# Patient Record
Sex: MALE | Race: ASIAN | URBAN - METROPOLITAN AREA
[De-identification: names, ages, dates, MRNs, and addresses within clinical notes are randomized per-mention and may not be internally consistent; named-entity substitution may affect disease eponyms.]

---

## 2018-09-01 ENCOUNTER — OFFICE VISIT (OUTPATIENT)
Dept: URGENT CARE | Facility: URGENT CARE | Age: 43
End: 2018-09-01
Payer: COMMERCIAL

## 2018-09-01 VITALS
DIASTOLIC BLOOD PRESSURE: 88 MMHG | SYSTOLIC BLOOD PRESSURE: 126 MMHG | HEART RATE: 104 BPM | RESPIRATION RATE: 14 BRPM | WEIGHT: 144.6 LBS | OXYGEN SATURATION: 98 % | TEMPERATURE: 100.8 F

## 2018-09-01 DIAGNOSIS — R50.9 FEVER WITH CHILLS: ICD-10-CM

## 2018-09-01 DIAGNOSIS — R06.02 SOB (SHORTNESS OF BREATH): ICD-10-CM

## 2018-09-01 DIAGNOSIS — R19.7 DIARRHEA, UNSPECIFIED TYPE: ICD-10-CM

## 2018-09-01 DIAGNOSIS — R65.10 SIRS (SYSTEMIC INFLAMMATORY RESPONSE SYNDROME) (H): Primary | ICD-10-CM

## 2018-09-01 DIAGNOSIS — R10.10 PAIN OF UPPER ABDOMEN: ICD-10-CM

## 2018-09-01 DIAGNOSIS — R40.0 SOMNOLENCE: ICD-10-CM

## 2018-09-01 PROCEDURE — 99205 OFFICE O/P NEW HI 60 MIN: CPT | Performed by: FAMILY MEDICINE

## 2018-09-01 NOTE — PROGRESS NOTES
SUBJECTIVE:   Enoc Zhang is a 43 year old male who presents to clinic today for the following health issues:      Chief Complaint   Patient presents with     Breathing Problem     SOB, diarrhea 10 times today, fever 100.2 oral, upper abdominal pain, chills x today.       Duration: today     Description (location/character/radiation): c/o breathing difficulty, diarrhea x 10 times, upper abdominal pain, fever, chills     Intensity:  severe    Accompanying signs and symptoms: Generalized weakness/fatigue    History (similar episodes/previous evaluation): None    Precipitating or alleviating factors: did eat seafood last night    Therapies tried and outcome: OTC analgesia     Denies using reactional drugs or excessive alcohol intake        Problem list and histories reviewed & adjusted, as indicated.  Additional history: as documented        Social History     Social History     Marital status: N/A     Spouse name: N/A     Number of children: N/A     Years of education: N/A     Occupational History     Not on file.     Social History Main Topics     Smoking status: Unknown If Ever Smoked     Smokeless tobacco: Never Used     Alcohol use Not on file     Drug use: Not on file     Sexual activity: Not on file     Other Topics Concern     Not on file     Social History Narrative     No narrative on file     Current Outpatient Prescriptions   Medication Sig Dispense Refill     study - albuterol (IDS# 5119) 108 (90 BASE) MCG/ACT Inhaler Inhale 2 puffs into the lungs every 6 hours       No Known Allergies  No lab results found.   BP Readings from Last 3 Encounters:   09/01/18 126/88    Wt Readings from Last 3 Encounters:   09/01/18 144 lb 9.6 oz (65.6 kg)                  Labs reviewed in EPIC    Reviewed and updated as needed this visit by clinical staff  Allergies  Meds       Reviewed and updated as needed this visit by Provider         ROS:  Constitutional, HEENT, cardiovascular, pulmonary, GI, , musculoskeletal,  neuro, skin, endocrine and psych systems are negative, except as otherwise noted.    OBJECTIVE:   /88  Pulse 104  Temp 100.8  F (38.2  C) (Tympanic)  Resp 14  Wt 144 lb 9.6 oz (65.6 kg)  SpO2 98%  GENERAL: alert and in distress, somewhat somnolent   EYES: Eyes grossly normal to inspection, PERRL and conjunctivae and sclerae normal  HENT: ear canals and TM's normal, nose and mouth without ulcers or lesions  NECK: no adenopathy, no asymmetry, masses, or scars and thyroid normal to palpation  RESP: lungs clear to auscultation - no rales, rhonchi or wheezes  CV: tachycardia, normal S1 S2, no S3 or S4 and no murmur, click or rub  ABDOMEN: soft, nontender, no hepatosplenomegaly, no masses and bowel sounds normal  MS: no gross musculoskeletal defects noted, no edema  SKIN: no suspicious lesions or rashes  NEURO: appears somnolent/lethargic, no focal neurology noted       ASSESSMENT/PLAN:       ICD-10-CM    1. SIRS (systemic inflammatory response syndrome) (H) R65.10    2. SOB (shortness of breath) R06.02    3. Pain of upper abdomen R10.10    4. Fever with chills R50.9    5. Somnolence R40.0    6. Diarrhea, unspecified type R19.7        43 year old male presents with severe upper abdominal pain, diarrhea, fever, chills and sob. Symptoms started earlier this morning, ate seafood last night. Physical exam remarkable for some distress, somnolence, tachycardia and low grade fever. Suspect symptoms due to severe gastroenteritis or pneumonia, meeting SIRS criteria. Need comprehensive evaluation to delineate specific diagnosis and rule out differentials. Recommended that we shold call ambulance for ER transfer which he refused and decided to drive himself there. Risks explained but patient remained adamant. [facility] ER informed. All questions answered.         Randy Navarro MD  [facility]

## 2018-09-01 NOTE — MR AVS SNAPSHOT
"              After Visit Summary   2018    Enoc Zhang    MRN: [id number]           Patient Information     Date Of Birth          1975        Visit Information        Provider Department      2018 2:20 PM Randy Navarro MD [facility]        Today's Diagnoses     SIRS (systemic inflammatory response syndrome) (H)    -  1    SOB (shortness of breath)        Pain of upper abdomen        Fever with chills        Somnolence        Diarrhea, unspecified type           Follow-ups after your visit        Who to contact     If you have questions or need follow up information about today's clinic visit or your schedule please contact [facility] directly at [phone].  Normal or non-critical lab and imaging results will be communicated to you by MyChart, letter or phone within 4 business days after the clinic has received the results. If you do not hear from us within 7 days, please contact the clinic through MyChart or phone. If you have a critical or abnormal lab result, we will notify you by phone as soon as possible.  Submit refill requests through [employer] or call your pharmacy and they will forward the refill request to us. Please allow 3 business days for your refill to be completed.          Additional Information About Your Visit        MyChart Information     [employer] lets you send messages to your doctor, view your test results, renew your prescriptions, schedule appointments and more. To sign up, go to [email or web address] . Click on \"Log in\" on the left side of the screen, which will take you to the Welcome page. Then click on \"Sign up Now\" on the right side of the page.     You will be asked to enter the access code listed below, as well as some personal information. Please follow the directions to create your username and password.     Your access code is: [email or web address]  Expires: 2018  3:13 PM     Your access code will  in 90 days. If you need help " or a new code, please call your [facility] or [phone].        Care EveryWhere ID     This is your Care EveryWhere ID. This could be used by other organizations to access your [facility] medical records  [id number]        Your Vitals Were     Pulse Temperature Respirations Pulse Oximetry          104 100.8  F (38.2  C) (Tympanic) 14 98%         Blood Pressure from Last 3 Encounters:   09/01/18 126/88    Weight from Last 3 Encounters:   09/01/18 144 lb 9.6 oz (65.6 kg)              Today, you had the following     No orders found for display       Primary Care Provider Office Phone # Fax #    [facility] [phone] [address] [facility] [address]        Equal Access to Services     MARGOT CERRATO : Hadii adia starr hadasho Soomaali, waaxda luqadaha, qaybta kaalmada adeegyada, randolph naylorin haythang rojas . So [facility] [phone].    ATENCIÓN: Si habla español, tiene a chavira disposición servicios gratuitos de asistencia lingüística. Llame al [phone].    We comply with applicable federal civil rights laws and [place] laws. We do not discriminate on the basis of race, color, national origin, age, disability, sex, sexual orientation, or gender identity.            Thank you!     Thank you for choosing [facility]  for your care. Our goal is always to provide you with excellent care. Hearing back from our patients is one way we can continue to improve our services. Please take a few minutes to complete the written survey that you may receive in the mail after your visit with us. Thank you!             Your Updated Medication List - Protect others around you: Learn how to safely use, store and throw away your medicines at www.disposemymeds.org.          This list is accurate as of 9/1/18  3:13 PM.  Always use your most recent med list.                   Brand Name Dispense Instructions for use Diagnosis    study - albuterol 108 (90 BASE) MCG/ACT Inhaler     IDS# [id number]     Inhale 2 puffs into the lungs every 6 hours